# Patient Record
(demographics unavailable — no encounter records)

---

## 2025-03-23 NOTE — DISCUSSION/SUMMARY
[FreeTextEntry1] : RICK  is a healthy, thriving 5 year old who presents without identified concerns for congestive heart failure nor impaired cardiac output by his  past medical history nor on his  current physical exam. his  EKG and echocardiogram were further reassuring.   - RICK was incidentally noted to have trivial tricuspid, physiologic pulmonary and trivial mitral regurgitation in otherwise well functioning valves. This was not audible on physical exam and not hemodynamically significant at this time. -Right ventricular conduction delay on EKG.    - There is a moderate to large secundum atrial septal defect with left to right flow. Large atrial level shunt with signs of a dilated left ventricle and diastolic flattening of the interventricular septum. There is mild flow acceleration in the main pulmonary artery extending into the bilateral branch PAs without appreciated discrete stenosis audible on exam.  Normal biventricular systolic function. TR gradient inadequate for measurement of PA pressure.   -I explained to the family at length the natural history of large atrial septal defects. The volume load of the atrial level left to right shunt causes right heart enlargement that is reversible through childhood. The increased volume seen by the pulmonary bed only rarely causes pulmonary vascular changes, and typically not before late adolescence or adulthood (however in children with chronic lung disease and prematurity, vascular changes may occur earlier). The family is aware that RICK may require intervention in the future. Based on its location, I anticipate he may be a candidate for transcatheter device closure, though surgical intervention is also available and safe. I recommended referral to Dr. Hines interventional cardiology for evaluation and management consideration. We reviewed interventional closure versus surgical closure options and indications.      I recommended 4 month follow up and we reviewed signs and symptoms that should prompt medical attention and sooner evaluation. Above information explained in detail with assistance of a colored diagram.  RICK and family verbalized their understanding, agreed with the plan and all questions were answered. Above information obtained and provided with assistance of Japanese phone translation.    [Needs SBE Prophylaxis] : [unfilled] does not need bacterial endocarditis prophylaxis [May participate in all age-appropriate activities] : [unfilled] May participate in all age-appropriate activities.

## 2025-03-23 NOTE — CONSULT LETTER
[Today's Date] : [unfilled] [Name] : Name: [unfilled] [] : : ~~ [Today's Date:] : [unfilled] [Dear  ___:] : Dear Dr. [unfilled]: [Consult] : I had the pleasure of evaluating your patient, [unfilled]. My full evaluation follows. [Consult - Single Provider] : Thank you very much for allowing me to participate in the care of this patient. If you have any questions, please do not hesitate to contact me. [Sincerely,] : Sincerely, [FreeTextEntry4] : Marie Jones MD [FreeTextEntry5] : [address]  [FreeTextEntry6] : [place] [address] [de-identified] : Devin Benz DO MPH Pediatric Cardiology [facility]

## 2025-03-23 NOTE — CARDIOLOGY SUMMARY
[LVSF ___%] : LV Shortening Fraction [unfilled]% [de-identified] : 3/18/25 [FreeTextEntry1] : Normal sinus rhythm @ 81 bpm MS: 174 ms QRS: 94 ms  QTc: 420 ms P-R-T Axis (1-48-31) rSR' in v1; right ventricular conduction delay Incomplete RBBB Normal voltage and intervals No ST segment abnormalities No pre-excitation  [de-identified] : 3/18/25 [FreeTextEntry2] :  A complete 2D, M-mode, doppler and color flow doppler transthoracic pediatric echocardiogram was performed. The intracardiac anatomy and doppler flow profiles were otherwise normal appearing with the following:  Summary: 1. Moderate to large, secundum type defect in interatrial septum, with left to right flow across the interatrial septum. 2. Trivial tricuspid valve regurgitation. 3. The tricuspid regurgitant jet, as recorded, is inadequate for the purpose of estimating right ventricular systolic pressure. 4. Mild flow acceleration in the main pulmonary artery extending into the bilateral branch pulmonary arteries without discrete stenosis seen. 5. Trivial mitral valve regurgitation. 6. Flattened diastolic position of interventricular septum. 7. Mildly dilated right ventricle. 8. Qualitatively normal right ventricular systolic function. 9. Normal left ventricular size, morphology and systolic function. 10. No pericardial effusion.

## 2025-03-23 NOTE — REASON FOR VISIT
[Initial Evaluation] : an initial evaluation of [Parents] : parents [Language Line ] : provided by Language Line   [Interpreters_IDNumber] : [id number] [Interpreters_FullName] : Alexander [TWNoteComboBox1] : Sao Tomean

## 2025-03-23 NOTE — REASON FOR VISIT
[Initial Evaluation] : an initial evaluation of [Parents] : parents [Language Line ] : provided by Language Line   [Interpreters_IDNumber] : [id number] [Interpreters_FullName] : Alexander [TWNoteComboBox1] : South Korean

## 2025-03-23 NOTE — PHYSICAL EXAM
[General Appearance - Alert] : alert [General Appearance - In No Acute Distress] : in no acute distress [General Appearance - Well Nourished] : well nourished [General Appearance - Well Developed] : well developed [General Appearance - Well-Appearing] : well appearing [Appearance Of Head] : the head was normocephalic [Facies] : there were no dysmorphic facial features [Sclera] : the conjunctiva were normal [Outer Ear] : the ears and nose were normal in appearance [Examination Of The Oral Cavity] : mucous membranes were moist and pink [Auscultation Breath Sounds / Voice Sounds] : breath sounds clear to auscultation bilaterally [Normal Chest Appearance] : the chest was normal in appearance [Apical Impulse] : quiet precordium with normal apical impulse [Heart Rate And Rhythm] : normal heart rate and rhythm [Heart Sounds] : normal S1 and S2 [Heart Sounds Gallop] : no gallops [Heart Sounds Pericardial Friction Rub] : no pericardial rub [Edema] : no edema [Arterial Pulses] : normal upper and lower extremity pulses with no pulse delay [Heart Sounds Click] : no clicks [Capillary Refill Test] : normal capillary refill [Systolic] : systolic [III] : a grade 3/6   [LLSB] : LLSB  [LMSB] : LMSB  [LUSB] : LUSB [L Axilla] : left axilla [R Axilla] : R axilla [Back] : the murmur was transmitted to the back [Bowel Sounds] : normal bowel sounds [Abdomen Soft] : soft [Nondistended] : nondistended [Abdomen Tenderness] : non-tender [Nail Clubbing] : no clubbing  or cyanosis of the fingers [Motor Tone] : normal muscle strength and tone [Cervical Lymph Nodes Enlarged Anterior] : The anterior cervical nodes were normal [Cervical Lymph Nodes Enlarged Posterior] : The posterior cervical nodes were normal [] : no rash [Skin Lesions] : no lesions [Skin Turgor] : normal turgor [Demonstrated Behavior - Infant Nonreactive To Parents] : interactive [Mood] : mood and affect were appropriate for age [Demonstrated Behavior] : normal behavior [FreeTextEntry7] : Femoral Pulse +2 B/L; Posterior tibial pulse +2 B/L

## 2025-03-23 NOTE — CONSULT LETTER
[Today's Date] : [unfilled] [Name] : Name: [unfilled] [] : : ~~ [Today's Date:] : [unfilled] [Dear  ___:] : Dear Dr. [unfilled]: [Consult] : I had the pleasure of evaluating your patient, [unfilled]. My full evaluation follows. [Consult - Single Provider] : Thank you very much for allowing me to participate in the care of this patient. If you have any questions, please do not hesitate to contact me. [Sincerely,] : Sincerely, [FreeTextEntry4] : Marie Jones MD [FreeTextEntry5] : [address]  [FreeTextEntry6] : [place] [address] [de-identified] : Devin Benz DO MPH Pediatric Cardiology [facility]

## 2025-03-23 NOTE — DISCUSSION/SUMMARY
[FreeTextEntry1] : RICK  is a healthy, thriving 5 year old who presents without identified concerns for congestive heart failure nor impaired cardiac output by his  past medical history nor on his  current physical exam. his  EKG and echocardiogram were further reassuring.   - RICK was incidentally noted to have trivial tricuspid, physiologic pulmonary and trivial mitral regurgitation in otherwise well functioning valves. This was not audible on physical exam and not hemodynamically significant at this time. -Right ventricular conduction delay on EKG.    - There is a moderate to large secundum atrial septal defect with left to right flow. Large atrial level shunt with signs of a dilated left ventricle and diastolic flattening of the interventricular septum. There is mild flow acceleration in the main pulmonary artery extending into the bilateral branch PAs without appreciated discrete stenosis audible on exam.  Normal biventricular systolic function. TR gradient inadequate for measurement of PA pressure.   -I explained to the family at length the natural history of large atrial septal defects. The volume load of the atrial level left to right shunt causes right heart enlargement that is reversible through childhood. The increased volume seen by the pulmonary bed only rarely causes pulmonary vascular changes, and typically not before late adolescence or adulthood (however in children with chronic lung disease and prematurity, vascular changes may occur earlier). The family is aware that RICK may require intervention in the future. Based on its location, I anticipate he may be a candidate for transcatheter device closure, though surgical intervention is also available and safe. I recommended referral to Dr. Hines interventional cardiology for evaluation and management consideration. We reviewed interventional closure versus surgical closure options and indications.      I recommended 4 month follow up and we reviewed signs and symptoms that should prompt medical attention and sooner evaluation. Above information explained in detail with assistance of a colored diagram.  RICK and family verbalized their understanding, agreed with the plan and all questions were answered. Above information obtained and provided with assistance of Sinhala phone translation.    [Needs SBE Prophylaxis] : [unfilled] does not need bacterial endocarditis prophylaxis [May participate in all age-appropriate activities] : [unfilled] May participate in all age-appropriate activities.

## 2025-03-23 NOTE — HISTORY OF PRESENT ILLNESS
[FreeTextEntry1] : Maryanne is a well appearing, playful 5 year old who was referred for evaluation of his newly appreciated heart murmur. He is accompanied by his parent(s) who provided additional information with the assistance of Hungarian phone translation.   Maryanne presents doing well and no reported symptoms.  Parent report an appreciated heart murmur about one month ago during a routine pediatric well visit. They do not recall him being sick at the time. His parents state it was loud and present on his anterior chest wall.   There has been no unexplained crying or irritability to suggest chest pain or palpitations.  There has been no chest pain, palpitations, cyanosis, shortness of breath, dizziness, lightheadedness, syncope or near syncope.  No reported history of feeding difficulties. Good appetite, remaining well hydrated. Normal urine output. No reported concerns with growth or development.  Interactive, alert; without excessive fatigue or activity induced symptoms.  No chest pain, palpitations or syncope with exercise.  There has been no recent fevers, illnesses or hospitalizations. No known sick contacts.  Born abroad in [facility]; premature. Parent denies pre or past  complications. No ETT or CLD.     No family history of an arrhythmia, aortic aneurysm, unexplained death, bicuspid aortic valve,  congenital heart disease, cardiomyopathy or sudden cardiac death, long QT syndrome, drowning or unexplained accidental death.

## 2025-03-23 NOTE — CARDIOLOGY SUMMARY
[LVSF ___%] : LV Shortening Fraction [unfilled]% [de-identified] : 3/18/25 [FreeTextEntry1] : Normal sinus rhythm @ 81 bpm NE: 174 ms QRS: 94 ms  QTc: 420 ms P-R-T Axis (1-48-31) rSR' in v1; right ventricular conduction delay Incomplete RBBB Normal voltage and intervals No ST segment abnormalities No pre-excitation  [de-identified] : 3/18/25 [FreeTextEntry2] :  A complete 2D, M-mode, doppler and color flow doppler transthoracic pediatric echocardiogram was performed. The intracardiac anatomy and doppler flow profiles were otherwise normal appearing with the following:  Summary: 1. Moderate to large, secundum type defect in interatrial septum, with left to right flow across the interatrial septum. 2. Trivial tricuspid valve regurgitation. 3. The tricuspid regurgitant jet, as recorded, is inadequate for the purpose of estimating right ventricular systolic pressure. 4. Mild flow acceleration in the main pulmonary artery extending into the bilateral branch pulmonary arteries without discrete stenosis seen. 5. Trivial mitral valve regurgitation. 6. Flattened diastolic position of interventricular septum. 7. Mildly dilated right ventricle. 8. Qualitatively normal right ventricular systolic function. 9. Normal left ventricular size, morphology and systolic function. 10. No pericardial effusion.

## 2025-03-23 NOTE — HISTORY OF PRESENT ILLNESS
[FreeTextEntry1] : Maryanne is a well appearing, playful 5 year old who was referred for evaluation of his newly appreciated heart murmur. He is accompanied by his parent(s) who provided additional information with the assistance of Mongolian phone translation.   Maryanne presents doing well and no reported symptoms.  Parent report an appreciated heart murmur about one month ago during a routine pediatric well visit. They do not recall him being sick at the time. His parents state it was loud and present on his anterior chest wall.   There has been no unexplained crying or irritability to suggest chest pain or palpitations.  There has been no chest pain, palpitations, cyanosis, shortness of breath, dizziness, lightheadedness, syncope or near syncope.  No reported history of feeding difficulties. Good appetite, remaining well hydrated. Normal urine output. No reported concerns with growth or development.  Interactive, alert; without excessive fatigue or activity induced symptoms.  No chest pain, palpitations or syncope with exercise.  There has been no recent fevers, illnesses or hospitalizations. No known sick contacts.  Born abroad in [facility]; premature. Parent denies pre or past  complications. No ETT or CLD.     No family history of an arrhythmia, aortic aneurysm, unexplained death, bicuspid aortic valve,  congenital heart disease, cardiomyopathy or sudden cardiac death, long QT syndrome, drowning or unexplained accidental death.

## 2025-05-28 NOTE — PHYSICAL EXAM
[General Appearance - Alert] : alert [General Appearance - In No Acute Distress] : in no acute distress [General Appearance - Well Nourished] : well nourished [General Appearance - Well Developed] : well developed [General Appearance - Well-Appearing] : well appearing [Appearance Of Head] : the head was normocephalic [Facies] : there were no dysmorphic facial features [Sclera] : the conjunctiva were normal [Outer Ear] : the ears and nose were normal in appearance [Examination Of The Oral Cavity] : mucous membranes were moist and pink [Auscultation Breath Sounds / Voice Sounds] : breath sounds clear to auscultation bilaterally [Normal Chest Appearance] : the chest was normal in appearance [Apical Impulse] : quiet precordium with normal apical impulse [Heart Rate And Rhythm] : normal heart rate and rhythm [Heart Sounds] : normal S1 and S2 [Heart Sounds Gallop] : no gallops [Heart Sounds Pericardial Friction Rub] : no pericardial rub [Edema] : no edema [Arterial Pulses] : normal upper and lower extremity pulses with no pulse delay [Heart Sounds Click] : no clicks [Capillary Refill Test] : normal capillary refill [Systolic] : systolic [III] : a grade 3/6   [LMSB] : LMSB  [Bowel Sounds] : normal bowel sounds [Abdomen Soft] : soft [Nondistended] : nondistended [Abdomen Tenderness] : non-tender [Nail Clubbing] : no clubbing  or cyanosis of the fingers [Motor Tone] : normal muscle strength and tone [] : no rash [Skin Lesions] : no lesions [Skin Turgor] : normal turgor [Demonstrated Behavior - Infant Nonreactive To Parents] : interactive [Mood] : mood and affect were appropriate for age [Demonstrated Behavior] : normal behavior

## 2025-05-28 NOTE — CONSULT LETTER
[Today's Date] : [unfilled] [Name] : Name: [unfilled] [] : : ~~ [Today's Date:] : [unfilled] [Dear  ___:] : Dear Dr. [unfilled]: [Consult - Single Provider] : Thank you very much for allowing me to participate in the care of this patient. If you have any questions, please do not hesitate to contact me. [Sincerely,] : Sincerely, [DrChika  ___] : Dr. THOMAS [FreeTextEntry4] : Devin Benz DO, MPH [FreeTextEntry5] : [address], [address], [place] [id number]  [de-identified] : Thank you for allowing me to participate in the care of this patient.  Please see my note for my assessment and plan and feel free to contact me if there are any questions or concerns. [de-identified] : Larry iHnes MD, MS, [facility] Congenital Interventional Cardiologist Director of Pediatric Cardiac Catheterization [facility]  [facility], [facility] [facility] [employer] [facility] [place] [address] Tel: [phone] Fax: [phone]   [email or web address]

## 2025-05-28 NOTE — CLEARANCE
[Cleared] : ~He/She~ is cleared for dental procedure.  [Cleared] : ~He/She~ is cleared for the surgical procedure [Does not require] : ~He/She~ does not require SBE prophylaxis [Does not require] : ~He/She~ does not require anesthesia by nor in consultation with a Pediatric Cardiac Anesthesiologist [May participate in all age appropriate activities.] : ~He/She~ may participate in all age appropriate activities

## 2025-05-28 NOTE — REASON FOR VISIT
[Initial Consultation] : an initial consultation for [Parents] : parents [Language Line ] : provided by Language Line   [FreeTextEntry3] : hx: Atrial Septal Defect, pre-cath [Interpreters_IDNumber] : [id number] [Interpreters_FullName] : Yasmine [TWNoteComboBox1] : Israeli

## 2025-05-28 NOTE — DISCUSSION/SUMMARY
[May participate in all age-appropriate activities] : [unfilled] May participate in all age-appropriate activities. [FreeTextEntry1] : PROBLEM LIST: 1. Moderate-large secundum ASD.   I spoke with RICK and RICK's parents as a pre-catheterization consult.  RICK has a moderate-large secundum ASD with associated right heart dilation.  I have reviewed the relevant pre-procedural imaging.  I discussed the risks and benefits of percutaneous ASD device closure and briefly touched on those associated with surgical ASD closure.  I specifically addressed the procedural risks as well as early and late-term risks of device erosion, device-related cardiac injury and associated symptomatology, including the potential need for emergent clinical evaluation - even years after device implantation.  I discussed device closure using both the Abbott Amplatzer septal occluder device and the [employer] Cardioform devices.  I explained that he will require SBE prophylaxis and ASA for 6 months post-device implant.  I addressed all questions asked.   RICK and RICK's parents wish to proceed with ASD device closure in the cath lab.  We will schedule the procedure for next few months.   In the interim, if there are any further questions, concerns or changes in his clinical status we would be happy to see him at that time.  The patient and family were instructed to return if RICK develops chest pain, palpitations, cyanosis, respiratory distress, exercise intolerance, syncope or any other concerning symptoms.  He does not require SBE prophylaxis or activity limitations.  The family expressed understanding of and agreement with the plan.  All questions were answered.   Thank you for allowing us to participate in the care of this patient.  Feel free to reach out to us with any further questions or concerns. [Needs SBE Prophylaxis] : [unfilled] does not need bacterial endocarditis prophylaxis

## 2025-05-28 NOTE — CARDIOLOGY SUMMARY
[Today's Date] : [unfilled] [FreeTextEntry1] : Sinus rhythm at a rate of 93 beats per minute with a normal TX interval.  There is rSR' in V1, likely secondary to RV conduction delay.  There is no evidence of atrial enlargement, ventricular hypertrophy or pre-excitation.  The QRS axis is normal.  The QTc is within normal limits with no ST or T-wave changes. [FreeTextEntry2] : 1. Large secundum atrial septal defect, with a membrane running through the posterior/inferior portion. The aortic and SVC rims are deficient. There is a good posterior rim (10 mm). The IVC rim is present but small (7mm). 2. All four pulmonary veins seen draining normally to the left atrium - on this or the prior study. 3. Mild to moderately dilated right atrium. 4. Mild-moderately dilated right ventricle with normal systolic function. 5. Normal left ventricular size, morphology and systolic function. 6. Flattened diastolic position of interventricular septum. 7. No pericardial effusion.

## 2025-05-28 NOTE — HISTORY OF PRESENT ILLNESS
[FreeTextEntry1] : I had the pleasure of seeing RICK BATISTA in the pediatric cardiology clinic of [facility] on May 28, 2025.  He was accompanied by his parents and the visit was conducted via a  #[id number], Yasmine.  As you know, RICK is a 6-year-old M with a recently diagnosed moderate-large ASD.  He is generally asymptomatic and able to keep up with his peers.  There has been no chest pain, palpitations, excessive diaphoresis, shortness of breath or syncope. There has been no recent change in activity level, no fatigue, and no difficulty gaining weight or weight loss. He is normally active and has had no recent decrease in athletic endurance.

## 2025-07-02 NOTE — HISTORY OF PRESENT ILLNESS
[FreeTextEntry1] : Maryanne is a well appearing, playful 6 year old with a large ASD who presents for a follow up evaluation and discussion of management options. He is accompanied by his parents who provided additional information with assistance of Setswana phone translation.   Maryanne presents doing well and he remains asymptomatic.  Since prior visit seen by Dr. Hines who agreed with diagnosis and recommended Cath closure. Assessment felt the defect was amendable to device closure.   There has been no unexplained crying or irritability to suggest chest pain or palpitations.  There has been no chest pain, palpitations, cyanosis, shortness of breath, dizziness, lightheadedness, syncope or near syncope.  No reported history of feeding difficulties. Good appetite, remaining well hydrated. Normal urine output. No reported concerns with growth or development.  Playful without excessive fatigue or activity induced symptoms.  No chest pain, palpitations or syncope with exercise.  There has been no recent fevers, illnesses or hospitalizations. Born abroad in [facility]; premature. Parent denies pre or past  complications. No ETT or CLD.     No family history of an arrhythmia, aortic aneurysm, unexplained death, bicuspid aortic valve,  congenital heart disease, cardiomyopathy or sudden cardiac death, long QT syndrome, drowning or unexplained accidental death.

## 2025-07-02 NOTE — REASON FOR VISIT
[Follow-Up] : a follow-up visit for [Parents] : parents [Language Line ] : provided by Language Line   [Interpreters_IDNumber] : [id number] [Interpreters_FullName] : Мария/Jes [TWNoteComboBox1] : Turkish

## 2025-07-02 NOTE — CONSULT LETTER
[Today's Date] : [unfilled] [Name] : Name: [unfilled] [] : : ~~ [Today's Date:] : [unfilled] [Dear  ___:] : Dear Dr. [unfilled]: [Consult] : I had the pleasure of evaluating your patient, [unfilled]. My full evaluation follows. [Consult - Single Provider] : Thank you very much for allowing me to participate in the care of this patient. If you have any questions, please do not hesitate to contact me. [Sincerely,] : Sincerely, [FreeTextEntry4] : Marie Jones MD [FreeTextEntry5] : [address]  [FreeTextEntry6] : [place] [address] [de-identified] : Devin Benz DO MPH Pediatric Cardiology [facility]

## 2025-07-02 NOTE — REASON FOR VISIT
[Follow-Up] : a follow-up visit for [Parents] : parents [Language Line ] : provided by Language Line   [Interpreters_IDNumber] : [id number] [Interpreters_FullName] : Мария/Jes [TWNoteComboBox1] : Hungarian

## 2025-07-02 NOTE — DISCUSSION/SUMMARY
[Needs SBE Prophylaxis] : [unfilled] does not need bacterial endocarditis prophylaxis [May participate in all age-appropriate activities] : [unfilled] May participate in all age-appropriate activities. [FreeTextEntry1] : RICK  is a healthy, thriving 6 year old who presents without identified concerns for congestive heart failure nor impaired cardiac output by his  past medical history nor on his  current physical exam.  -EKG with ongoing right ventricular conduction delay.  -The right heart is dilated. Normal left ventricular size and biventricular systolic function.   -There is ongoing trivial tricuspid, physiologic pulmonary and trivial mitral regurgitation in otherwise well functioning valves. This was not audible on physical exam and not hemodynamically significant at this time.   - There is an ongoing moderate to large secundum atrial septal defect with left to right flow. Large atrial level shunt with signs of a dilated right ventricle, right atrium and diastolic flattening of the interventricular septum  indicative of a right heart volume overload. There is mild flow acceleration in the main pulmonary artery extending into the bilateral branch PAs without appreciated discrete stenosis audible on exam.   -I explained to the family at length the natural history of large atrial septal defects. The volume load of the atrial level left to right shunt causes right heart enlargement that is reversible through childhood. The increased volume seen by the pulmonary bed only rarely causes pulmonary vascular changes, and typically not before late adolescence or adulthood (however in children with chronic lung disease and prematurity, vascular changes may occur earlier).   -Reviewed Dr. Hines consultation. I explained the findings and that Dr. Hines in review felt that despite some deficiency in rims the defect would be amendable to device closure. Reiterated procedure generalities as well as risks and benefits as put forth by Dr. Hines. I also discussed the potential need for surgical intervention if determined unable to close in cath lab.  I explained that he will require SBE prophylaxis and ASA for 6 months post-device implant.   -Additionally, there may be a trivial adjacent defect in the secundum atrial septum but does not appear hemodynamically significant within itself.    I recommended follow up one week post cath which parent reports is planned for this summer and we reviewed signs and symptoms that should prompt medical attention and sooner evaluation. Above information explained in detail with assistance of a colored diagram.  RICK and family verbalized their understanding, agreed with the plan and all questions were answered. Above information obtained and provided with assistance of Sami phone translation.

## 2025-07-02 NOTE — CARDIOLOGY SUMMARY
[LVSF ___%] : LV Shortening Fraction [unfilled]% [de-identified] : 6/23/25 [FreeTextEntry1] : Normal sinus rhythm @ 72 bpm IA: 170 ms QRS: 94 ms  QTc: 424 ms P-R-T Axis (-4-60-52) rSR' in v1; right ventricular conduction delay Incomplete RBBB Normal voltage and intervals No ST segment abnormalities No pre-excitation  [de-identified] : 6/23/25 [FreeTextEntry2] :  A complete 2D, M-mode, doppler and color flow doppler transthoracic pediatric echocardiogram was performed. The intracardiac anatomy and doppler flow profiles were otherwise normal appearing with the following:  Summary: 1. Moderate to large, secundum type defect in interatrial septum, with left to right flow across the interatrial septum. 2. Cannot exclude an additional trivial secundum defect adjacent and inferior to the larger ASD; left to right flow. 3. Mild to moderately dilated right atrium. 4. Trivial tricuspid valve regurgitation. 5. Trivial pulmonary valve regurgitation. 6. Mild flow acceleration in the main pulmonary artery extending into the bilateral branch pulmonary arteries without discrete stenosis seen. 7. Trivial mitral valve regurgitation. 8. Flattened diastolic position of interventricular septum. 9. Mildly dilated right ventricle. 10. Qualitatively normal right ventricular systolic function. 11. Normal left ventricular size, morphology and systolic function. 12. No pericardial effusion.

## 2025-07-02 NOTE — CONSULT LETTER
[Today's Date] : [unfilled] [Name] : Name: [unfilled] [] : : ~~ [Today's Date:] : [unfilled] [Dear  ___:] : Dear Dr. [unfilled]: [Consult] : I had the pleasure of evaluating your patient, [unfilled]. My full evaluation follows. [Consult - Single Provider] : Thank you very much for allowing me to participate in the care of this patient. If you have any questions, please do not hesitate to contact me. [Sincerely,] : Sincerely, [FreeTextEntry4] : Marie Jones MD [FreeTextEntry5] : [address]  [FreeTextEntry6] : [place] [address] [de-identified] : Devin Benz DO MPH Pediatric Cardiology [facility]

## 2025-07-02 NOTE — HISTORY OF PRESENT ILLNESS
[FreeTextEntry1] : Maryanne is a well appearing, playful 6 year old with a large ASD who presents for a follow up evaluation and discussion of management options. He is accompanied by his parents who provided additional information with assistance of Persian phone translation.   Maryanne presents doing well and he remains asymptomatic.  Since prior visit seen by Dr. Hines who agreed with diagnosis and recommended Cath closure. Assessment felt the defect was amendable to device closure.   There has been no unexplained crying or irritability to suggest chest pain or palpitations.  There has been no chest pain, palpitations, cyanosis, shortness of breath, dizziness, lightheadedness, syncope or near syncope.  No reported history of feeding difficulties. Good appetite, remaining well hydrated. Normal urine output. No reported concerns with growth or development.  Playful without excessive fatigue or activity induced symptoms.  No chest pain, palpitations or syncope with exercise.  There has been no recent fevers, illnesses or hospitalizations. Born [place] in [facility]; premature. Parent denies pre or past  complications. No ETT or CLD.     No family history of an arrhythmia, aortic aneurysm, unexplained death, bicuspid aortic valve,  congenital heart disease, cardiomyopathy or sudden cardiac death, long QT syndrome, drowning or unexplained accidental death.

## 2025-07-02 NOTE — CARDIOLOGY SUMMARY
[LVSF ___%] : LV Shortening Fraction [unfilled]% [de-identified] : 6/23/25 [FreeTextEntry1] : Normal sinus rhythm @ 72 bpm FL: 170 ms QRS: 94 ms  QTc: 424 ms P-R-T Axis (-4-60-52) rSR' in v1; right ventricular conduction delay Incomplete RBBB Normal voltage and intervals No ST segment abnormalities No pre-excitation  [de-identified] : 6/23/25 [FreeTextEntry2] :  A complete 2D, M-mode, doppler and color flow doppler transthoracic pediatric echocardiogram was performed. The intracardiac anatomy and doppler flow profiles were otherwise normal appearing with the following:  Summary: 1. Moderate to large, secundum type defect in interatrial septum, with left to right flow across the interatrial septum. 2. Cannot exclude an additional trivial secundum defect adjacent and inferior to the larger ASD; left to right flow. 3. Mild to moderately dilated right atrium. 4. Trivial tricuspid valve regurgitation. 5. Trivial pulmonary valve regurgitation. 6. Mild flow acceleration in the main pulmonary artery extending into the bilateral branch pulmonary arteries without discrete stenosis seen. 7. Trivial mitral valve regurgitation. 8. Flattened diastolic position of interventricular septum. 9. Mildly dilated right ventricle. 10. Qualitatively normal right ventricular systolic function. 11. Normal left ventricular size, morphology and systolic function. 12. No pericardial effusion.

## 2025-07-02 NOTE — DISCUSSION/SUMMARY
[Needs SBE Prophylaxis] : [unfilled] does not need bacterial endocarditis prophylaxis [May participate in all age-appropriate activities] : [unfilled] May participate in all age-appropriate activities. [FreeTextEntry1] : RICK  is a healthy, thriving 6 year old who presents without identified concerns for congestive heart failure nor impaired cardiac output by his  past medical history nor on his  current physical exam.  -EKG with ongoing right ventricular conduction delay.  -The right heart is dilated. Normal left ventricular size and biventricular systolic function.   -There is ongoing trivial tricuspid, physiologic pulmonary and trivial mitral regurgitation in otherwise well functioning valves. This was not audible on physical exam and not hemodynamically significant at this time.   - There is an ongoing moderate to large secundum atrial septal defect with left to right flow. Large atrial level shunt with signs of a dilated right ventricle, right atrium and diastolic flattening of the interventricular septum  indicative of a right heart volume overload. There is mild flow acceleration in the main pulmonary artery extending into the bilateral branch PAs without appreciated discrete stenosis audible on exam.   -I explained to the family at length the natural history of large atrial septal defects. The volume load of the atrial level left to right shunt causes right heart enlargement that is reversible through childhood. The increased volume seen by the pulmonary bed only rarely causes pulmonary vascular changes, and typically not before late adolescence or adulthood (however in children with chronic lung disease and prematurity, vascular changes may occur earlier).   -Reviewed Dr. Hines consultation. I explained the findings and that Dr. Hines in review felt that despite some deficiency in rims the defect would be amendable to device closure. Reiterated procedure generalities as well as risks and benefits as put forth by Dr. Hines. I also discussed the potential need for surgical intervention if determined unable to close in cath lab.  I explained that he will require SBE prophylaxis and ASA for 6 months post-device implant.   -Additionally, there may be a trivial adjacent defect in the secundum atrial septum but does not appear hemodynamically significant within itself.    I recommended follow up one week post cath which parent reports is planned for this summer and we reviewed signs and symptoms that should prompt medical attention and sooner evaluation. Above information explained in detail with assistance of a colored diagram.  RICK and family verbalized their understanding, agreed with the plan and all questions were answered. Above information obtained and provided with assistance of Georgian phone translation.